# Patient Record
Sex: MALE | Race: BLACK OR AFRICAN AMERICAN | Employment: FULL TIME | URBAN - METROPOLITAN AREA
[De-identification: names, ages, dates, MRNs, and addresses within clinical notes are randomized per-mention and may not be internally consistent; named-entity substitution may affect disease eponyms.]

---

## 2020-04-05 ENCOUNTER — APPOINTMENT (OUTPATIENT)
Dept: GENERAL RADIOLOGY | Age: 30
End: 2020-04-05

## 2020-04-05 ENCOUNTER — HOSPITAL ENCOUNTER (EMERGENCY)
Age: 30
Discharge: HOME | End: 2020-04-05
Attending: EMERGENCY MEDICINE

## 2020-04-05 VITALS
OXYGEN SATURATION: 99 % | SYSTOLIC BLOOD PRESSURE: 131 MMHG | WEIGHT: 265.2 LBS | TEMPERATURE: 99.3 F | RESPIRATION RATE: 17 BRPM | HEIGHT: 69 IN | HEART RATE: 126 BPM | BODY MASS INDEX: 39.28 KG/M2 | DIASTOLIC BLOOD PRESSURE: 86 MMHG

## 2020-04-05 PROCEDURE — 99283 EMERGENCY DEPT VISIT LOW MDM: CPT

## 2020-04-05 PROCEDURE — 71046 X-RAY EXAM CHEST 2 VIEWS: CPT

## 2020-04-05 PROCEDURE — 6370000000 HC RX 637 (ALT 250 FOR IP): Performed by: EMERGENCY MEDICINE

## 2020-04-05 PROCEDURE — 73630 X-RAY EXAM OF FOOT: CPT

## 2020-04-05 PROCEDURE — L4387 NON-PNEUM WALK BOOT PRE OTS: HCPCS

## 2020-04-05 RX ORDER — IBUPROFEN 400 MG/1
800 TABLET ORAL ONCE
Status: COMPLETED | OUTPATIENT
Start: 2020-04-05 | End: 2020-04-05

## 2020-04-05 RX ORDER — ACETAMINOPHEN 500 MG
1000 TABLET ORAL EVERY 8 HOURS PRN
Qty: 60 TABLET | Refills: 3 | Status: SHIPPED | OUTPATIENT
Start: 2020-04-05

## 2020-04-05 RX ORDER — IBUPROFEN 600 MG/1
600 TABLET ORAL EVERY 6 HOURS PRN
Qty: 30 TABLET | Refills: 0 | Status: SHIPPED | OUTPATIENT
Start: 2020-04-05

## 2020-04-05 RX ADMIN — IBUPROFEN 800 MG: 400 TABLET, FILM COATED ORAL at 09:49

## 2020-04-05 ASSESSMENT — PAIN DESCRIPTION - LOCATION
LOCATION: FOOT
LOCATION: FOOT

## 2020-04-05 ASSESSMENT — PAIN DESCRIPTION - ORIENTATION
ORIENTATION: RIGHT
ORIENTATION: RIGHT

## 2020-04-05 ASSESSMENT — PAIN - FUNCTIONAL ASSESSMENT: PAIN_FUNCTIONAL_ASSESSMENT: 0-10

## 2020-04-05 ASSESSMENT — PAIN SCALES - GENERAL
PAINLEVEL_OUTOF10: 10
PAINLEVEL_OUTOF10: 10

## 2020-04-05 ASSESSMENT — PAIN DESCRIPTION - PAIN TYPE
TYPE: ACUTE PAIN
TYPE: ACUTE PAIN

## 2020-04-05 ASSESSMENT — PAIN DESCRIPTION - DESCRIPTORS
DESCRIPTORS: ACHING
DESCRIPTORS: ACHING

## 2020-04-05 NOTE — ED PROVIDER NOTES
erythema, lesions or other abnormalities on exposed skin of face and arms  Extremities: R foot lateral metatarsal 5 there is STS and pain on palpation. Good cap refill and Dp pulse. No ankle pain on range of motion or palpation of malleoli. No knee pain or foreleg pain. Neurologic: Alert, oriented x 3. No focal deficits upon moving arms and legs  Psychiatric: Appropriate demeanor without agitation or internal stimulation      MEDICAL DECISION MAKING  Pt here with isolated R foot injury. No evidence of significant neurovascular injury. Suspect right fifth metatarsal fracture. Separately, and he does not complain of this whatsoever have asked him regarding cough due to recent coated spread in our community in light of his very mild tachycardia 115 on exam.  He states he may have a very mild cough but no shortness of breath and no fevers. Xr: acute fx base of 5th metatarsal (non-barbosa); will place in post-op shoe, crutches  CXR neg    Tachycardia likely secondary to pain, no evidence or complaints of any infectious source. Foot does not appear to have any abrasion or infection source. Images reviewed, patient counseled, and knows to follow up with orthopedics in 1 week or less, especially if symptoms persist, and to follow up sooner if they worsen. DISPOSITION  Home    IMPRESSION:  R foot 5th metatarsal fracture  tachycardia      This medical chart used with aid of transcription software. As such, there may be inadvertent errors in transcription of spellings and words despite physician's attempts to correct all possible errors.          Annamarie Yang MD  04/05/20 9705